# Patient Record
Sex: FEMALE | Race: WHITE | Employment: FULL TIME | URBAN - NONMETROPOLITAN AREA
[De-identification: names, ages, dates, MRNs, and addresses within clinical notes are randomized per-mention and may not be internally consistent; named-entity substitution may affect disease eponyms.]

---

## 2024-02-02 RX ORDER — SODIUM CHLORIDE 9 MG/ML
INJECTION, SOLUTION INTRAVENOUS CONTINUOUS
Status: DISCONTINUED | OUTPATIENT
Start: 2024-02-02 | End: 2024-02-07 | Stop reason: HOSPADM

## 2024-02-02 RX ORDER — SODIUM CHLORIDE 0.9 % (FLUSH) 0.9 %
5-40 SYRINGE (ML) INJECTION EVERY 12 HOURS SCHEDULED
Status: DISCONTINUED | OUTPATIENT
Start: 2024-02-02 | End: 2024-02-07 | Stop reason: HOSPADM

## 2024-02-02 RX ORDER — SODIUM CHLORIDE 0.9 % (FLUSH) 0.9 %
5-40 SYRINGE (ML) INJECTION PRN
Status: DISCONTINUED | OUTPATIENT
Start: 2024-02-02 | End: 2024-02-07 | Stop reason: HOSPADM

## 2024-02-02 RX ORDER — SODIUM CHLORIDE 9 MG/ML
25 INJECTION, SOLUTION INTRAVENOUS PRN
Status: DISCONTINUED | OUTPATIENT
Start: 2024-02-02 | End: 2024-02-07 | Stop reason: HOSPADM

## 2024-02-07 ENCOUNTER — ANESTHESIA EVENT (OUTPATIENT)
Dept: ENDOSCOPY | Age: 39
End: 2024-02-07
Payer: COMMERCIAL

## 2024-02-07 ENCOUNTER — ANESTHESIA (OUTPATIENT)
Dept: ENDOSCOPY | Age: 39
End: 2024-02-07
Payer: COMMERCIAL

## 2024-02-07 ENCOUNTER — HOSPITAL ENCOUNTER (OUTPATIENT)
Age: 39
Setting detail: OUTPATIENT SURGERY
Discharge: HOME | End: 2024-02-07
Attending: INTERNAL MEDICINE | Admitting: INTERNAL MEDICINE
Payer: COMMERCIAL

## 2024-02-07 VITALS
WEIGHT: 257.8 LBS | BODY MASS INDEX: 38.18 KG/M2 | SYSTOLIC BLOOD PRESSURE: 123 MMHG | DIASTOLIC BLOOD PRESSURE: 62 MMHG | OXYGEN SATURATION: 96 % | HEIGHT: 69 IN | HEART RATE: 89 BPM | RESPIRATION RATE: 18 BRPM | TEMPERATURE: 96.7 F

## 2024-02-07 LAB — PREGNANCY, URINE: NEGATIVE

## 2024-02-07 PROCEDURE — 3609012700 HC EGD DILATION SAVORY: Performed by: INTERNAL MEDICINE

## 2024-02-07 PROCEDURE — 3609012400 HC EGD TRANSORAL BIOPSY SINGLE/MULTIPLE: Performed by: INTERNAL MEDICINE

## 2024-02-07 PROCEDURE — 3609010300 HC COLONOSCOPY W/BIOPSY SINGLE/MULTIPLE: Performed by: INTERNAL MEDICINE

## 2024-02-07 PROCEDURE — 7100000011 HC PHASE II RECOVERY - ADDTL 15 MIN: Performed by: INTERNAL MEDICINE

## 2024-02-07 PROCEDURE — 6360000002 HC RX W HCPCS: Performed by: NURSE ANESTHETIST, CERTIFIED REGISTERED

## 2024-02-07 PROCEDURE — 2580000003 HC RX 258: Performed by: INTERNAL MEDICINE

## 2024-02-07 PROCEDURE — 88305 TISSUE EXAM BY PATHOLOGIST: CPT

## 2024-02-07 PROCEDURE — 81025 URINE PREGNANCY TEST: CPT

## 2024-02-07 PROCEDURE — 3700000000 HC ANESTHESIA ATTENDED CARE: Performed by: INTERNAL MEDICINE

## 2024-02-07 PROCEDURE — 2500000003 HC RX 250 WO HCPCS: Performed by: NURSE ANESTHETIST, CERTIFIED REGISTERED

## 2024-02-07 PROCEDURE — 3700000001 HC ADD 15 MINUTES (ANESTHESIA): Performed by: INTERNAL MEDICINE

## 2024-02-07 PROCEDURE — 2709999900 HC NON-CHARGEABLE SUPPLY: Performed by: INTERNAL MEDICINE

## 2024-02-07 PROCEDURE — 7100000010 HC PHASE II RECOVERY - FIRST 15 MIN: Performed by: INTERNAL MEDICINE

## 2024-02-07 RX ORDER — DIAZEPAM 2 MG/1
2 TABLET ORAL EVERY 6 HOURS PRN
COMMUNITY

## 2024-02-07 RX ORDER — PROPOFOL 10 MG/ML
INJECTION, EMULSION INTRAVENOUS PRN
Status: DISCONTINUED | OUTPATIENT
Start: 2024-02-07 | End: 2024-02-07 | Stop reason: SDUPTHER

## 2024-02-07 RX ORDER — ONDANSETRON 2 MG/ML
INJECTION INTRAMUSCULAR; INTRAVENOUS PRN
Status: DISCONTINUED | OUTPATIENT
Start: 2024-02-07 | End: 2024-02-07 | Stop reason: SDUPTHER

## 2024-02-07 RX ORDER — FENTANYL CITRATE 50 UG/ML
INJECTION, SOLUTION INTRAMUSCULAR; INTRAVENOUS PRN
Status: DISCONTINUED | OUTPATIENT
Start: 2024-02-07 | End: 2024-02-07 | Stop reason: SDUPTHER

## 2024-02-07 RX ORDER — VENLAFAXINE 100 MG/1
100 TABLET ORAL DAILY
COMMUNITY

## 2024-02-07 RX ORDER — LIDOCAINE HYDROCHLORIDE 20 MG/ML
INJECTION, SOLUTION EPIDURAL; INFILTRATION; INTRACAUDAL; PERINEURAL PRN
Status: DISCONTINUED | OUTPATIENT
Start: 2024-02-07 | End: 2024-02-07 | Stop reason: SDUPTHER

## 2024-02-07 RX ADMIN — LIDOCAINE HYDROCHLORIDE 80 MG: 20 INJECTION, SOLUTION EPIDURAL; INFILTRATION; INTRACAUDAL; PERINEURAL at 12:44

## 2024-02-07 RX ADMIN — FENTANYL CITRATE 50 MCG: 50 INJECTION, SOLUTION INTRAMUSCULAR; INTRAVENOUS at 12:44

## 2024-02-07 RX ADMIN — PROPOFOL 30 MG: 10 INJECTION, EMULSION INTRAVENOUS at 12:52

## 2024-02-07 RX ADMIN — PROPOFOL 50 MG: 10 INJECTION, EMULSION INTRAVENOUS at 12:48

## 2024-02-07 RX ADMIN — PROPOFOL 40 MG: 10 INJECTION, EMULSION INTRAVENOUS at 12:56

## 2024-02-07 RX ADMIN — SODIUM CHLORIDE: 9 INJECTION, SOLUTION INTRAVENOUS at 13:05

## 2024-02-07 RX ADMIN — PROPOFOL 100 MG: 10 INJECTION, EMULSION INTRAVENOUS at 12:44

## 2024-02-07 RX ADMIN — SODIUM CHLORIDE: 9 INJECTION, SOLUTION INTRAVENOUS at 11:38

## 2024-02-07 RX ADMIN — ONDANSETRON 4 MG: 2 INJECTION INTRAMUSCULAR; INTRAVENOUS at 12:44

## 2024-02-07 RX ADMIN — PROPOFOL 50 MG: 10 INJECTION, EMULSION INTRAVENOUS at 12:46

## 2024-02-07 RX ADMIN — FENTANYL CITRATE 50 MCG: 50 INJECTION, SOLUTION INTRAMUSCULAR; INTRAVENOUS at 12:45

## 2024-02-07 RX ADMIN — PROPOFOL 50 MG: 10 INJECTION, EMULSION INTRAVENOUS at 13:00

## 2024-02-07 RX ADMIN — PROPOFOL 30 MG: 10 INJECTION, EMULSION INTRAVENOUS at 12:50

## 2024-02-07 ASSESSMENT — PAIN - FUNCTIONAL ASSESSMENT
PAIN_FUNCTIONAL_ASSESSMENT: NONE - DENIES PAIN
PAIN_FUNCTIONAL_ASSESSMENT: NONE - DENIES PAIN
PAIN_FUNCTIONAL_ASSESSMENT: 0-10

## 2024-02-07 ASSESSMENT — PAIN DESCRIPTION - DESCRIPTORS: DESCRIPTORS: BURNING

## 2024-02-07 NOTE — PROGRESS NOTES
EGD completed. Tolerated well. Photos taken. Biopsies taken. Esophageal dilation with 48, 51, and 54fr american dilators. One specimen jar labeled and sent to lab. Scope [id number].

## 2024-02-07 NOTE — BRIEF OP NOTE
Brief Postoperative Note      Patient: Sadia Martinez  YOB: 1985  MRN: [id number]    Date of Procedure: 2/7/2024    Pre-Op Diagnosis Codes:     * Gastroesophageal reflux disease, unspecified whether esophagitis present [K21.9]     * Diarrhea, unspecified type [R19.7]    Post-Op Diagnosis: Dysphagia esophageal dilation to size 54 [place], biopsy to the duodenum duodenitis as well as diarrhea normal large bowel biopsy obtained due to history of diarrhea.        Procedure(s):  EGD BIOPSY  EGD DILATION SAVORY  COLONOSCOPY WITH BIOPSY    Surgeon(s):  Stephen Huntley MD    Assistant:  * No surgical staff found *    Anesthesia: Monitor Anesthesia Care    Estimated Blood Loss (mL): none    Complications: None    Specimens:   ID Type Source Tests Collected by Time Destination   A : biopsy duodenum h/o diarrhea Tissue Duodenum SURGICAL PATHOLOGY Stephen Huntley MD 2/7/2024 1253    B : biopsy ascending and transverse colon h/o diarrhea Tissue Colon SURGICAL PATHOLOGY Stephen Huntley MD 2/7/2024 1303    C : biopsy descending and sigmoid colon h/o diarrhea Tissue Colon SURGICAL PATHOLOGY Stephen Huntley MD 2/7/2024 1304        Implants:  * No implants in log *      Drains: * No LDAs found *    Findings:  Dysphagia esophageal dilation to size 54 [place], biopsy to the duodenum duodenitis as well as diarrhea normal large bowel biopsy obtained due to history of diarrhea.       Electronically signed by Stephen Huntley MD on 2/7/2024 at 1:11 PM

## 2024-02-07 NOTE — PROGRESS NOTES
Recovery mode, denies discomfort. Passing gas, taking fluids. Dr. Huntley discussed findings with pt and . Discharge instructions provided and understanding verbalized.

## 2024-02-07 NOTE — ANESTHESIA POSTPROCEDURE EVALUATION
Department of Anesthesiology  Postprocedure Note    Patient: Sadia Martinez  MRN: [id number]  YOB: 1985  Date of evaluation: 2/7/2024    Procedure Summary       Date: 02/07/24 Room / Location: [place] [address] / [facility]    Anesthesia Start: 1234 Anesthesia Stop: 1307    Procedures:       EGD BIOPSY (Left: Esophagus)      EGD DILATION SAVORY      COLONOSCOPY WITH BIOPSY Diagnosis:       Gastroesophageal reflux disease, unspecified whether esophagitis present      Diarrhea, unspecified type      (Gastroesophageal reflux disease, unspecified whether esophagitis present [K21.9])      (Diarrhea, unspecified type [R19.7])    Surgeons: Stephen Huntley MD Responsible Provider: Romeo Briceno DO    Anesthesia Type: MAC ASA Status: 2            Anesthesia Type: No value filed.    Manuela Phase I: Manuela Score: 10    Manuela Phase II:      Anesthesia Post Evaluation    Patient location during evaluation: bedside  Patient participation: complete - patient participated  Level of consciousness: awake and alert  Pain score: 0  Airway patency: patent  Nausea & Vomiting: no nausea and no vomiting  Cardiovascular status: blood pressure returned to baseline  Respiratory status: acceptable and room air  Hydration status: euvolemic  Pain management: adequate and satisfactory to patient        No notable events documented.

## 2024-02-07 NOTE — PROGRESS NOTES
Colonoscopy completed. Tolerated well. Photos taken. Biopsies taken. Two specimen jars labeled and sent to lab. Scope [id number].

## 2024-02-07 NOTE — DISCHARGE INSTRUCTIONS
Dr. Stephen Huntley  [facility] [address]  [place], [place] [address]  [phone]       Follow up in office with Dr. Huntley on Tuesday, February 27th at 11:00

## 2024-02-07 NOTE — H&P
[facility]  Sedation/Analgesia History & Physical    Patient: Sadia Martinez : 1985  Med Rec#: [id number] Acc#: [id number]   Provider Performing Procedure: Stephen Huntley MD  Primary Care Physician: Phan Forbes MD    PRE-PROCEDURE   Full CODE [x]Yes  DNR-CCA/DNR-CC []Yes   Brief History/Pre-Procedure Diagnosis:    1. Gastroesophageal reflux disease without esophagitis  COLONOSCOPY W/ OR W/O BIOPSY     ESOPHAGOSCOPY / EGD       2. Epigastric pain  COLONOSCOPY W/ OR W/O BIOPSY     ESOPHAGOSCOPY / EGD       3. Diarrhea, unspecified type  COLONOSCOPY W/ OR W/O BIOPSY     ESOPHAGOSCOPY / EGD       4. Esophageal dysphagia  COLONOSCOPY W/ OR W/O BIOPSY     ESOPHAGOSCOPY / EGD       5. Morbid obesity with BMI of 40.0-44.9, adult (HCC)  COLONOSCOPY W/ OR W/O BIOPSY     ESOPHAGOSCOPY / EGD                PLAN:  EGD with duodenal biopsy to be done soon to evaluate.  Colonoscopy also with random biopsy obtained from different locations to be done.  Due to high risk involved with this patient likely the procedure will be done in the hospital setting.  More recommendations after endoscopy.             MEDICAL HISTORY  []CAD/Valve  []Liver Disease  []Lung Disease []Diabetes  []Hypertension []Renal Disease  []Additional information:       has a past medical history of Asthma, Dysmenorrhea, Hypertension, IBS (irritable bowel syndrome), Migraines, and Uterine fibroid.    SURGICAL HISTORY   has a past surgical history that includes Cholecystectomy; Tubal ligation; Septoplasty; laparoscopy; and Knee arthroscopy w/ debridement.  Additional information:       ALLERGIES   Allergies as of 2024 - Fully Reviewed 2023   Allergen Reaction Noted    Nalbuphine  2010    Prochlorperazine edisylate  2010    Prochlorperazine Anxiety 2017     Additional information:       MEDICATIONS   Coumadin Use Last 7 Days [x]No []Yes  Antiplatelet drug therapy use last 7 days  [x]No

## 2024-02-08 NOTE — OP NOTE
the  guidewire in the dilation of esophagus, dilator withdrawn.  Scope again  to the upper sphincter and both stricture was bleeding post dilation.   Scope withdrawn from the stomach.  Biopsy of the duodenum showed  duodenitis as well as recurrent diarrhea.  Endoscope withdrawn with no  immediate complication.    IMPRESSION:  1.  Dysphagia, successful dilation to size 54-Ur[place].  2.  Duodenal biopsy with history of diarrhea as well as weight loss  as well as duodenitis.    PLAN:  Followup with biopsy results and the GI Clinic for evaluation and  more recommendation after reviewing the biopsy results.    COLONOSCOPY WITH BIOPSY:  Digital examination revealed normal rectum.   Colonoscope 190 Olympus advanced under direct vision from rectum up to  the cecum.  Prep was good and the patient tolerated the procedure well.   Cecum intubation confirmed by appendiceal orifice.  Scope withdrawn.   Ascending sigmoid biopsied due to the history of diarrhea.  Descending  sigmoid biopsy obtained due to history of diarrhea and the scope  withdrawn with no immediate complication.    IMPRESSION:  1.  Normal colonoscopy.  2.  Biopsy with fundoplication due to history of diarrhea.    PLAN:  Followup with the biopsy results at the GI Clinic for evaluation.  More recommendations after reviewing the biopsy results.        RAJIV GAYTAN M.D.    D: 02/07/2024 13:28:47       T: 02/07/2024 21:47:31     [email or web address]  Job#: [id number]     Doc#: [id number]    CC:  Phan Forbes MD

## (undated) DEVICE — GLOVE ORTHO 8   MSG9480